# Patient Record
Sex: FEMALE | Race: WHITE | NOT HISPANIC OR LATINO | URBAN - METROPOLITAN AREA
[De-identification: names, ages, dates, MRNs, and addresses within clinical notes are randomized per-mention and may not be internally consistent; named-entity substitution may affect disease eponyms.]

---

## 2019-03-29 ENCOUNTER — TELEPHONE (OUTPATIENT)
Dept: FAMILY MEDICINE | Facility: CLINIC | Age: 54
End: 2019-03-29

## 2019-03-29 NOTE — TELEPHONE ENCOUNTER
Spoke with patient informed that she would need to come in for an appt, or go to UC.  Patient states she will go to her nearest UC.

## 2019-03-29 NOTE — TELEPHONE ENCOUNTER
----- Message from Miguel Ángel Collado sent at 3/29/2019  1:34 PM CDT -----  Contact: Patient [phone]  Sooner appointment than the  can schedule.  Did you offer to schedule the next available appointment and put the patient on the wait list?:  Declined  When is the first available appointment: 07/25/19  What is the nature of the appointment: Est Care/Sore throat  What visit type: NPP  Patient preference of timeframe to be scheduled:    Comments: former patient would like to est again, is aware will be moving to new location.    Please call an advise  Thank you